# Patient Record
Sex: MALE | Race: WHITE | NOT HISPANIC OR LATINO | Employment: FULL TIME | URBAN - METROPOLITAN AREA
[De-identification: names, ages, dates, MRNs, and addresses within clinical notes are randomized per-mention and may not be internally consistent; named-entity substitution may affect disease eponyms.]

---

## 2023-09-10 ENCOUNTER — APPOINTMENT (OUTPATIENT)
Dept: RADIOLOGY | Facility: IMAGING CENTER | Age: 28
End: 2023-09-10
Attending: NURSE PRACTITIONER
Payer: COMMERCIAL

## 2023-09-10 ENCOUNTER — OFFICE VISIT (OUTPATIENT)
Dept: URGENT CARE | Facility: PHYSICIAN GROUP | Age: 28
End: 2023-09-10
Payer: COMMERCIAL

## 2023-09-10 VITALS
SYSTOLIC BLOOD PRESSURE: 114 MMHG | OXYGEN SATURATION: 97 % | WEIGHT: 163.2 LBS | HEIGHT: 73 IN | TEMPERATURE: 98.1 F | RESPIRATION RATE: 20 BRPM | BODY MASS INDEX: 21.63 KG/M2 | DIASTOLIC BLOOD PRESSURE: 62 MMHG | HEART RATE: 94 BPM

## 2023-09-10 DIAGNOSIS — M77.32 HEEL SPUR, LEFT: ICD-10-CM

## 2023-09-10 DIAGNOSIS — M72.2 PLANTAR FASCIITIS OF LEFT FOOT: ICD-10-CM

## 2023-09-10 DIAGNOSIS — M79.672 LEFT FOOT PAIN: ICD-10-CM

## 2023-09-10 PROCEDURE — 99204 OFFICE O/P NEW MOD 45 MIN: CPT | Performed by: NURSE PRACTITIONER

## 2023-09-10 PROCEDURE — 3078F DIAST BP <80 MM HG: CPT | Performed by: NURSE PRACTITIONER

## 2023-09-10 PROCEDURE — 3074F SYST BP LT 130 MM HG: CPT | Performed by: NURSE PRACTITIONER

## 2023-09-10 PROCEDURE — 73630 X-RAY EXAM OF FOOT: CPT | Mod: TC,LT | Performed by: RADIOLOGY

## 2023-09-10 RX ORDER — PREDNISONE 20 MG/1
60 TABLET ORAL DAILY
Qty: 15 TABLET | Refills: 0 | Status: SHIPPED | OUTPATIENT
Start: 2023-09-10 | End: 2023-09-15

## 2023-09-10 ASSESSMENT — ENCOUNTER SYMPTOMS
NEUROLOGICAL NEGATIVE: 1
CONSTITUTIONAL NEGATIVE: 1

## 2023-09-10 ASSESSMENT — VISUAL ACUITY: OU: 1

## 2023-09-10 NOTE — PATIENT INSTRUCTIONS
Details    Reading Physician Reading Date Result Priority   Hank Zamorano M.D.  [phone] 9/10/2023 Urgent Care     Narrative & Impression     9/10/2023 2:57 PM     HISTORY/REASON FOR EXAM:  Left foot pain     TECHNIQUE/EXAM DESCRIPTION AND NUMBER OF VIEWS:  3 nonweightbearing views of the LEFT foot.     COMPARISON:     FINDINGS: Bone mineralization is normal. There is no evidence of fracture or dislocation. Soft tissues are normal. There is a small plantar calcaneal spur.     IMPRESSION:     No evidence of acute fracture or dislocation.           Exam Ended: 09/10/23  3:08 PM Last Resulted: 09/10/23  3:10 PM

## 2023-09-10 NOTE — PROGRESS NOTES
"Subjective:     Jatin Go is a 28 y.o. male who presents for Foot Problem (Left foot,painful,x2 weeks)       Foot Problem  This is a new problem. The problem has been gradually worsening.     2 weeks ago, patient started concerned spontaneous onset of left pain.  Reports pain starting at the middle of his arch and spreading towards his heel.  Worsens with weightbearing and walking.  Worsens with palpation.  Reports he is on his feet 12 hours a day, 3-4 days a week.  Otherwise, denies injury.  Has tried Aleve with mild improvement in symptoms.  Recently obtained insurance.  Has not seen PCP.    Review of Systems   Constitutional: Negative.    Musculoskeletal:         Per HPI   Neurological: Negative.    All other systems reviewed and are negative.    Refer to HPI for additional details.    During this visit, appropriate PPE was worn, and hand hygiene was performed.    PMH:  has no past medical history on file.    MEDS:   Current Outpatient Medications:     predniSONE (DELTASONE) 20 MG Tab, Take 3 Tablets by mouth every day for 5 days., Disp: 15 Tablet, Rfl: 0    ALLERGIES:   Allergies   Allergen Reactions    Methylphenidate Anxiety     SURGHX: History reviewed. No pertinent surgical history.  SOCHX:      FH: Per HPI as applicable/pertinent.      Objective:     /62 (BP Location: Right arm, Patient Position: Sitting, BP Cuff Size: Adult)   Pulse 94   Temp 36.7 °C (98.1 °F) (Temporal)   Resp 20   Ht 1.854 m (6' 1\")   Wt 74 kg (163 lb 3.2 oz)   SpO2 97%   BMI 21.53 kg/m²     Physical Exam  Nursing note reviewed.   Constitutional:       General: He is not in acute distress.     Appearance: He is well-developed. He is not ill-appearing or toxic-appearing.   Eyes:      General: Vision grossly intact.   Cardiovascular:      Rate and Rhythm: Normal rate.      Pulses: Normal pulses.   Pulmonary:      Effort: Pulmonary effort is normal. No respiratory distress.   Musculoskeletal:         General: No deformity. " Normal range of motion.      Left ankle: Normal.      Left Achilles Tendon: No defects.      Left foot: Normal range of motion and normal capillary refill. Tenderness (Plantar aspect of left foot over mid arch and heel, worse with dorsiflexion of toes) present. No swelling, deformity or laceration.   Skin:     General: Skin is warm and dry.      Capillary Refill: Capillary refill takes less than 2 seconds.      Coloration: Skin is not pale.      Findings: No bruising, erythema or rash.   Neurological:      Mental Status: He is alert and oriented to person, place, and time.      Motor: No weakness.      Gait: Gait abnormal (Antalgic).   Psychiatric:         Behavior: Behavior normal. Behavior is cooperative.     X-ray of left foot:    Details    Reading Physician Reading Date Result Priority   Hank Zamorano M.D.  [phone] 9/10/2023 Urgent Care     Narrative & Impression     9/10/2023 2:57 PM     HISTORY/REASON FOR EXAM:  Left foot pain     TECHNIQUE/EXAM DESCRIPTION AND NUMBER OF VIEWS:  3 nonweightbearing views of the LEFT foot.     COMPARISON:     FINDINGS: Bone mineralization is normal. There is no evidence of fracture or dislocation. Soft tissues are normal. There is a small plantar calcaneal spur.     IMPRESSION:     No evidence of acute fracture or dislocation.           Exam Ended: 09/10/23  3:08 PM Last Resulted: 09/10/23  3:10 PM           Radiology report and images reviewed by myself. Concur with findings.      Assessment/Plan:     1. Left foot pain  - DX-FOOT-COMPLETE 3+ LEFT; Future  - Referral to Podiatry  - Referral to establish with [facility] PCP  - predniSONE (DELTASONE) 20 MG Tab; Take 3 Tablets by mouth every day for 5 days.  Dispense: 15 Tablet; Refill: 0    2. Plantar fasciitis of left foot  - Referral to Podiatry  - Referral to establish with [facility] PCP  - predniSONE (DELTASONE) 20 MG Tab; Take 3 Tablets by mouth every day for 5 days.  Dispense: 15 Tablet; Refill: 0    3. Heel spur, left  -  Referral to Podiatry  - Referral to establish with [facility] PCP  - predniSONE (DELTASONE) 20 MG Tab; Take 3 Tablets by mouth every day for 5 days.  Dispense: 15 Tablet; Refill: 0    Rx as above sent electronically. Rest, ice, elevate. Advised to avoid NSAIDs while on steroid therapy.     Consider new footwear with supportive insoles or OTC orthotics.    Follow up with podiatry; referral placed.    Follow up with PCP for routine/preventive care; referral placed.    Monitor. Warning signs reviewed. Return precautions discussed.     Differential diagnosis, natural history, supportive care, over-the-counter symptom management per 's instructions, close monitoring, and indications for immediate follow-up discussed.     All questions answered. Patient agrees with the plan of care.    Discharge summary provided.    Work note provided.     Billing note: moderate complexity and moderate risk. New patient. [id number]. Please refer to LOS tool for details.

## 2023-09-10 NOTE — LETTER
September 10, 2023         Patient: Jatin Go   YOB: 1995   Date of Visit: 9/10/2023           To Whom it May Concern:    Jatin Go was seen in my clinic on 9/10/2023 due to illness. Due to medical necessity, please excuse patient from work 9/9/2023.    If you have any questions or concerns, please don't hesitate to call.        Sincerely,         ATIF Bonilla.  Electronically Signed

## 2023-09-19 ENCOUNTER — TELEPHONE (OUTPATIENT)
Dept: HEALTH INFORMATION MANAGEMENT | Facility: OTHER | Age: 28
End: 2023-09-19
Payer: COMMERCIAL

## 2023-11-03 ENCOUNTER — OFFICE VISIT (OUTPATIENT)
Dept: URGENT CARE | Facility: CLINIC | Age: 28
End: 2023-11-03
Payer: COMMERCIAL

## 2023-11-03 VITALS
HEIGHT: 71 IN | DIASTOLIC BLOOD PRESSURE: 64 MMHG | WEIGHT: 160 LBS | RESPIRATION RATE: 14 BRPM | TEMPERATURE: 97.1 F | SYSTOLIC BLOOD PRESSURE: 118 MMHG | BODY MASS INDEX: 22.4 KG/M2 | OXYGEN SATURATION: 98 % | HEART RATE: 81 BPM

## 2023-11-03 DIAGNOSIS — R11.2 NAUSEA AND VOMITING, UNSPECIFIED VOMITING TYPE: ICD-10-CM

## 2023-11-03 DIAGNOSIS — R19.7 DIARRHEA OF PRESUMED INFECTIOUS ORIGIN: ICD-10-CM

## 2023-11-03 PROCEDURE — 99214 OFFICE O/P EST MOD 30 MIN: CPT | Performed by: NURSE PRACTITIONER

## 2023-11-03 PROCEDURE — 3074F SYST BP LT 130 MM HG: CPT | Performed by: NURSE PRACTITIONER

## 2023-11-03 PROCEDURE — 3078F DIAST BP <80 MM HG: CPT | Performed by: NURSE PRACTITIONER

## 2023-11-03 RX ORDER — ONDANSETRON 2 MG/ML
4 INJECTION INTRAMUSCULAR; INTRAVENOUS ONCE
Status: COMPLETED | OUTPATIENT
Start: 2023-11-03 | End: 2023-11-03

## 2023-11-03 RX ORDER — ONDANSETRON 4 MG/1
4 TABLET, ORALLY DISINTEGRATING ORAL EVERY 8 HOURS PRN
Qty: 10 TABLET | Refills: 0 | Status: SHIPPED | OUTPATIENT
Start: 2023-11-03 | End: 2024-03-07

## 2023-11-03 RX ADMIN — ONDANSETRON 4 MG: 2 INJECTION INTRAMUSCULAR; INTRAVENOUS at 19:39

## 2023-11-03 ASSESSMENT — ENCOUNTER SYMPTOMS
BLOOD IN STOOL: 0
VOMITING: 1
CHILLS: 0
ROS GI COMMENTS: 1
DIARRHEA: 1
FATIGUE: 1
FEVER: 0
ABDOMINAL PAIN: 1
NAUSEA: 1
CHANGE IN BOWEL HABIT: 1
CONSTIPATION: 0

## 2023-11-04 NOTE — PROGRESS NOTES
"Subjective:   Jatin Go is a 28 y.o. male who presents for Food Poisoning (X 2day, vomit & diarrhea)      GI Problem  This is a new problem. Episode onset: 2 days; food poisoning of sushi. The problem occurs constantly. The problem has been gradually worsening. Associated symptoms include abdominal pain, a change in bowel habit, fatigue, nausea and vomiting. Pertinent negatives include no chills, congestion or fever. The symptoms are aggravated by eating. He has tried drinking for the symptoms. The treatment provided no relief.       Review of Systems   Constitutional:  Positive for fatigue and malaise/fatigue. Negative for chills and fever.   HENT:  Negative for congestion.    Gastrointestinal:  Positive for abdominal pain, change in bowel habit, diarrhea, nausea and vomiting. Negative for blood in stool, constipation and melena.        1       Medications:    ondansetron  ondansetron Tbdp    Allergies: Methylphenidate    Problem List: Jatin Go does not have a problem list on file.    Surgical History:  No past surgical history on file.    Past Social Hx: Jatin Go       Past Family Hx:  Jatin Go family history is not on file.     Problem list, medications, and allergies reviewed by myself today in Epic.     Objective:     /64 (BP Location: Left arm, Patient Position: Sitting, BP Cuff Size: Adult)   Pulse 81   Temp 36.2 °C (97.1 °F)   Resp 14   Ht 1.803 m (5' 11\")   Wt 72.6 kg (160 lb)   SpO2 98%   BMI 22.32 kg/m²     Physical Exam  Vitals and nursing note reviewed.   Constitutional:       General: He is not in acute distress.     Appearance: He is well-developed.   HENT:      Head: Normocephalic and atraumatic.      Right Ear: External ear normal.      Left Ear: External ear normal.      Nose: Nose normal.      Mouth/Throat:      Mouth: Mucous membranes are moist.   Eyes:      Conjunctiva/sclera: Conjunctivae normal.   Cardiovascular:      Rate and Rhythm: Normal rate.   Pulmonary:      Effort: " Pulmonary effort is normal. No respiratory distress.      Breath sounds: Normal breath sounds.   Abdominal:      General: Bowel sounds are normal. There is no distension.      Tenderness: There is no abdominal tenderness. There is no right CVA tenderness, left CVA tenderness, guarding or rebound. Negative signs include Fitzgerald's sign, Rovsing's sign, McBurney's sign, psoas sign and obturator sign.   Musculoskeletal:         General: Normal range of motion.   Skin:     General: Skin is warm and dry.   Neurological:      General: No focal deficit present.      Mental Status: He is alert and oriented to person, place, and time. Mental status is at baseline.      Gait: Gait (gait at baseline) normal.   Psychiatric:         Judgment: Judgment normal.         Assessment/Plan:     Diagnosis and associated orders:     1. Nausea and vomiting, unspecified vomiting type  ondansetron (ZOFRAN ODT) 4 MG TABLET DISPERSIBLE    ondansetron (Zofran) syringe/vial injection 4 mg      2. Diarrhea of presumed infectious origin           Comments/MDM:     Patient with acute illness with systemic nausea, vomiting and diarrhea abdomen with no acute tenderness treated with oral antiemetic in clinic it was explained today that due to the viral nature of the pt's illness, we will treat symptomatically today.   Encouraged OTC supportive meds PRN. Humidification, increase fluids, avoid night time dairy.   Discussed side effects of OTC meds and any prescribed.  Given precautionary s/sx that mandate immediate follow up and evaluation in the ED. Advised of risks of not doing so.    DDX, Supportive care, and indications for immediate follow-up discussed with patient.    Instructed to return to clinic or nearest emergency department if we are not available for any change in condition, further concerns, or worsening of symptoms.    The patient  and/or guardian demonstrated a good understanding and agreed with the treatment plan.                 Please  note that this dictation was created using voice recognition software. I have made a reasonable attempt to correct obvious errors, but I expect that there are errors of grammar and possibly content that I did not discover before finalizing the note.    This note was electronically signed by Mekhi SARABIA.

## 2024-03-07 ENCOUNTER — OFFICE VISIT (OUTPATIENT)
Dept: URGENT CARE | Facility: CLINIC | Age: 29
End: 2024-03-07
Payer: COMMERCIAL

## 2024-03-07 VITALS
TEMPERATURE: 97.3 F | WEIGHT: 150 LBS | HEART RATE: 76 BPM | RESPIRATION RATE: 18 BRPM | BODY MASS INDEX: 20.32 KG/M2 | OXYGEN SATURATION: 97 % | SYSTOLIC BLOOD PRESSURE: 122 MMHG | DIASTOLIC BLOOD PRESSURE: 70 MMHG | HEIGHT: 72 IN

## 2024-03-07 DIAGNOSIS — M79.672 LEFT FOOT PAIN: ICD-10-CM

## 2024-03-07 DIAGNOSIS — M77.32 HEEL SPUR, LEFT: ICD-10-CM

## 2024-03-07 DIAGNOSIS — M72.2 PLANTAR FASCIITIS OF LEFT FOOT: ICD-10-CM

## 2024-03-07 PROCEDURE — 99214 OFFICE O/P EST MOD 30 MIN: CPT | Performed by: NURSE PRACTITIONER

## 2024-03-07 PROCEDURE — 3078F DIAST BP <80 MM HG: CPT | Performed by: NURSE PRACTITIONER

## 2024-03-07 PROCEDURE — 3074F SYST BP LT 130 MM HG: CPT | Performed by: NURSE PRACTITIONER

## 2024-03-07 RX ORDER — PREDNISONE 20 MG/1
60 TABLET ORAL DAILY
Qty: 15 TABLET | Refills: 0 | Status: SHIPPED | OUTPATIENT
Start: 2024-03-07 | End: 2024-03-12

## 2024-03-07 ASSESSMENT — ENCOUNTER SYMPTOMS
NEUROLOGICAL NEGATIVE: 1
CONSTITUTIONAL NEGATIVE: 1

## 2024-03-07 ASSESSMENT — VISUAL ACUITY: OU: 1

## 2024-03-07 NOTE — LETTER
March 7, 2024         Patient: Jatin Go   YOB: 1995   Date of Visit: 3/7/2024           To Whom it May Concern:    Jatin Go was seen in my clinic on 3/7/2024 due to illness. Due to medical necessity, please excuse patient from work 3/7/2024.    If you have any questions or concerns, please don't hesitate to call.        Sincerely,         ATIF Bonilla.  Electronically Signed

## 2024-03-08 NOTE — PROGRESS NOTES
"Subjective:     Jatin Go is a 28 y.o. male who presents for Foot Pain (Lt foot,worse than before\" growth spur\" bottom of Lt bottom of heel, x 2 months )       Foot Problem  This is a chronic problem. The problem has been gradually worsening.     Seen in  9/10/2023.  Was experiencing left heel pain and middle arch pain.  X-ray was performed showing plantar calcaneal spur.  Diagnosed with left heel spur and plantar fasciitis of the left foot.  Was referred to podiatry as well as PCP.  Was prescribed prednisone.  Patient reports the prednisone helped significantly.  However, 1 month ago, started to develop reoccurrence of similar pain.  Was severe today and was not able to go to work.  Worsens with ambulation and palpation.  Needs a note.  Has not had a chance to schedule with podiatry yet due to being busy taking care of his dental problems.  Currently taking ibuprofen with no improvement in symptoms.    Review of Systems   Constitutional: Negative.    Musculoskeletal:         Per HPI   Neurological: Negative.    All other systems reviewed and are negative.    Refer to HPI for additional details.    During this visit, appropriate PPE was worn, and hand hygiene was performed.    PMH:  has no past medical history on file.    MEDS:   Current Outpatient Medications:     NON SPECIFIED, Buprophen 25 MG  PO, Disp: , Rfl:     predniSONE (DELTASONE) 20 MG Tab, Take 3 Tablets by mouth every day for 5 days., Disp: 15 Tablet, Rfl: 0    ALLERGIES:   Allergies   Allergen Reactions    Methylphenidate Anxiety     SURGHX: History reviewed. No pertinent surgical history.  SOCHX:  reports that he has never smoked. He has never used smokeless tobacco. He reports that he does not currently use alcohol. He reports current drug use. Drug: Oral.    FH: Per HPI as applicable/pertinent.      Objective:     /70 (BP Location: Left arm, Patient Position: Sitting, BP Cuff Size: Adult)   Pulse 76   Temp 36.3 °C (97.3 °F) (Temporal)   " Resp 18   Ht 1.829 m (6')   Wt 68 kg (150 lb)   SpO2 97%   BMI 20.34 kg/m²     Physical Exam  Nursing note reviewed.   Constitutional:       General: He is not in acute distress.     Appearance: He is well-developed. He is not ill-appearing or toxic-appearing.   Eyes:      General: Vision grossly intact.   Cardiovascular:      Rate and Rhythm: Normal rate.      Pulses: Normal pulses.   Pulmonary:      Effort: Pulmonary effort is normal. No respiratory distress.   Musculoskeletal:         General: No deformity. Normal range of motion.      Left foot: Normal range of motion and normal capillary refill. Tenderness (Plantar aspect of left foot over mid arch and heel, worse with dorsiflexion of toes) present. No swelling, deformity or laceration. Normal pulse.   Skin:     General: Skin is warm and dry.      Capillary Refill: Capillary refill takes less than 2 seconds.      Coloration: Skin is not pale.      Findings: No bruising or erythema.   Neurological:      Mental Status: He is alert and oriented to person, place, and time.      Motor: No weakness.      Gait: Gait abnormal (Antalgic).   Psychiatric:         Behavior: Behavior normal. Behavior is cooperative.       Assessment/Plan:     1. Left foot pain  - predniSONE (DELTASONE) 20 MG Tab; Take 3 Tablets by mouth every day for 5 days.  Dispense: 15 Tablet; Refill: 0    2. Plantar fasciitis of left foot  - predniSONE (DELTASONE) 20 MG Tab; Take 3 Tablets by mouth every day for 5 days.  Dispense: 15 Tablet; Refill: 0    3. Heel spur, left  - predniSONE (DELTASONE) 20 MG Tab; Take 3 Tablets by mouth every day for 5 days.  Dispense: 15 Tablet; Refill: 0    Rx as above sent electronically.  Advised to avoid NSAIDs while on steroid therapy.  Rest, ice, elevate.  May use OTC Tylenol as needed.  Work note provided.  Contact information for podiatry provided.  Patient will call to schedule.    Differential diagnosis, natural history, supportive care, over-the-counter  symptom management per 's instructions, close monitoring, and indications for immediate follow-up discussed.     All questions answered. Patient agrees with the plan of care.    Discharge summary provided.    Billing note: chronic illness with exacerbation/progression; prescription drug management. Established patient. [id number]. Please refer to LOS tool for details.

## 2024-03-08 NOTE — PATIENT INSTRUCTIONS
[facility]  [id number]  [place] [id number]  Phone: [phone]

## 2024-03-28 ENCOUNTER — HOSPITAL ENCOUNTER (EMERGENCY)
Facility: MEDICAL CENTER | Age: 29
End: 2024-03-28
Attending: EMERGENCY MEDICINE
Payer: COMMERCIAL

## 2024-03-28 ENCOUNTER — APPOINTMENT (OUTPATIENT)
Dept: RADIOLOGY | Facility: MEDICAL CENTER | Age: 29
End: 2024-03-28
Attending: EMERGENCY MEDICINE
Payer: COMMERCIAL

## 2024-03-28 VITALS
HEART RATE: 73 BPM | TEMPERATURE: 98.4 F | DIASTOLIC BLOOD PRESSURE: 78 MMHG | SYSTOLIC BLOOD PRESSURE: 110 MMHG | RESPIRATION RATE: 18 BRPM | WEIGHT: 144.4 LBS | OXYGEN SATURATION: 97 % | BODY MASS INDEX: 19.14 KG/M2 | HEIGHT: 73 IN

## 2024-03-28 DIAGNOSIS — J10.1 INFLUENZA B: ICD-10-CM

## 2024-03-28 LAB
FLUAV RNA SPEC QL NAA+PROBE: NEGATIVE
FLUBV RNA SPEC QL NAA+PROBE: POSITIVE
RSV RNA SPEC QL NAA+PROBE: NEGATIVE
SARS-COV-2 RNA RESP QL NAA+PROBE: NOTDETECTED

## 2024-03-28 PROCEDURE — 700102 HCHG RX REV CODE 250 W/ 637 OVERRIDE(OP): Performed by: EMERGENCY MEDICINE

## 2024-03-28 PROCEDURE — 0241U HCHG SARS-COV-2 COVID-19 NFCT DS RESP RNA 4 TRGT ED POC: CPT

## 2024-03-28 PROCEDURE — 71045 X-RAY EXAM CHEST 1 VIEW: CPT

## 2024-03-28 PROCEDURE — 700111 HCHG RX REV CODE 636 W/ 250 OVERRIDE (IP): Performed by: EMERGENCY MEDICINE

## 2024-03-28 PROCEDURE — A9270 NON-COVERED ITEM OR SERVICE: HCPCS | Performed by: EMERGENCY MEDICINE

## 2024-03-28 PROCEDURE — 99284 EMERGENCY DEPT VISIT MOD MDM: CPT

## 2024-03-28 RX ORDER — ACETAMINOPHEN 500 MG
1000 TABLET ORAL ONCE
Status: COMPLETED | OUTPATIENT
Start: 2024-03-28 | End: 2024-03-28

## 2024-03-28 RX ORDER — IBUPROFEN 600 MG/1
600 TABLET ORAL ONCE
Status: COMPLETED | OUTPATIENT
Start: 2024-03-28 | End: 2024-03-28

## 2024-03-28 RX ORDER — ONDANSETRON 4 MG/1
4 TABLET, ORALLY DISINTEGRATING ORAL ONCE
Status: COMPLETED | OUTPATIENT
Start: 2024-03-28 | End: 2024-03-28

## 2024-03-28 RX ORDER — OSELTAMIVIR PHOSPHATE 75 MG/1
75 CAPSULE ORAL 2 TIMES DAILY
Qty: 10 CAPSULE | Refills: 0 | Status: ACTIVE | OUTPATIENT
Start: 2024-03-28 | End: 2024-04-02

## 2024-03-28 RX ADMIN — IBUPROFEN 600 MG: 600 TABLET, FILM COATED ORAL at 16:05

## 2024-03-28 RX ADMIN — ONDANSETRON 4 MG: 4 TABLET, ORALLY DISINTEGRATING ORAL at 16:05

## 2024-03-28 RX ADMIN — ACETAMINOPHEN 1000 MG: 500 TABLET, FILM COATED ORAL at 16:05

## 2024-03-28 NOTE — ED TRIAGE NOTES
"Chief Complaint   Patient presents with    Flu Like Symptoms     Pt states he started feeling sick last night, headache, body aches, cough productive yellow in color congestion and photophobia      Patient also reports chills, pt ax0x4, ambulatory to triage. Pt educated on triage process and wait time. Educated on letting  know if condition changes. PCR covid running.     /81   Pulse 100   Temp 37.4 °C (99.4 °F) (Temporal)   Resp 20   Ht 1.854 m (6' 1\")   Wt 65.5 kg (144 lb 6.4 oz)   SpO2 98%   BMI 19.05 kg/m²     "

## 2024-03-28 NOTE — ED PROVIDER NOTES
"  ER Provider Note    Scribed for Dylan Gentile M.d. by Jensen Dasilva. 3/28/2024  3:52 PM    Primary Care Provider: Pcp Pt States None    CHIEF COMPLAINT  Chief Complaint   Patient presents with    Flu Like Symptoms     Pt states he started feeling sick last night, headache, body aches, cough productive yellow in color congestion and photophobia      LIMITATION TO HISTORY   Select: : None    HPI/ROS  OUTSIDE HISTORIAN(S):  None     EXTERNAL RECORDS REVIEWED  Other No recent visits for similar symptoms    Jatin Go is a 28 y.o. male who presents to the ED for evaluation of flu like symptoms. Onset last night. The patient reports associated symptoms of congestion, runny nose, cough, shortness of breath, headache, body aches, and fatigue. The patient denies any known fever or chills. He has been taking Dayquil with minimal alleviation. He last took some last night at approximately 9 PM. The patient has no major past medical history, takes no daily medications other than a medication to aid in quitting smoking. He has no allergies to medication.     PAST MEDICAL HISTORY  No past medical history reported.     SURGICAL HISTORY  No past surgical history reported.     FAMILY HISTORY  No family history reported.    SOCIAL HISTORY   reports that he has never smoked. He has never used smokeless tobacco. He reports that he does not currently use alcohol. He reports current drug use. Drug: Oral.    CURRENT MEDICATIONS  Previous Medications    NON SPECIFIED    Buprophen 25 MG  PO       ALLERGIES  Methylphenidate    PHYSICAL EXAM  /81   Pulse 100   Temp 37.4 °C (99.4 °F) (Temporal)   Resp 20   Ht 1.854 m (6' 1\")   Wt 65.5 kg (144 lb 6.4 oz)   SpO2 98%   BMI 19.05 kg/m²     Constitutional: Awake alert appears uncomfortable no acute cardiopulmonary distress  HENT: Normocephalic, Atraumatic, Bilateral external ears normal, Oropharynx erythematous with cobblestoning.  Nose is swollen mucosa with clear " rhinorrhea.  Eyes: PERRL, EOMI, Conjunctiva normal, No discharge.   Neck: Normal range of motion, anterior supracervical lymphadenopathy no meningismus.  Cardiovascular: Normal heart rate, Normal rhythm, No murmurs, No rubs, No gallops.   Thorax & Lungs: Normal breath sounds, No respiratory distress, No wheezing  Abdomen:  Soft, No tenderness  Skin: Warm, Dry, No erythema, No rash.   Back: No tenderness, No CVA tenderness.   Musculoskeletal: Good range of motion in all major joints.  Neurologic: Alert, No focal deficits noted.       DIAGNOSTIC STUDIES & PROCEDURES    Labs:   Results for orders placed or performed during the hospital encounter of 03/28/24   POC CoV-2, FLU A/B, RSV by PCR   Result Value Ref Range    POC Influenza A RNA, PCR Negative Negative    POC Influenza B RNA, PCR POSITIVE (A) Negative    POC RSV, by PCR Negative Negative    POC SARS-CoV-2, PCR NotDetected      All labs reviewed by me.    Radiology:   The attending Emergency Physician has independently interpreted the diagnostic imaging associated with this visit and is awaiting the final reading from the radiologist, which will be displayed below.      Radiologist interpretation:     DX-CHEST-PORTABLE (1 VIEW)   Final Result      No evidence of acute cardiopulmonary process.           COURSE & MEDICAL DECISION MAKING    ED Observation Status? No; Patient does not meet criteria for ED Observation.     INITIAL ASSESSMENT AND PLAN  Care Narrative: Records reviewed to establish patient's baseline labs and vitals.        3:52 PM - Patient seen and evaluated at bedside. Jatin Go is a 28 y.o. male who presents with flu like symptoms onset yesterday. Patient will be treated with Tylenol tablet 1 g, Motrin tablet 600 mg, and Zofran dispertab 4 mg for his symptoms. Ordered DX-Chest and POCT CoV-2, Flu A/B, and RSV  to evaluate. He understands and agrees to the plan of care. Differential diagnoses include but are not limited to: Viral illness most likely  influenza also considered RSV COVID and other viruses.  Considered secondary pneumonia.    Patient feels achy and ill he has headache and bodyaches and overall is a presentation strongly ches with influenza during influenza surge in the community.  He is currently febrile.  Flu swab is positive.  His lungs are clear is not hypoxic or tachypneic I doubt pneumonia.  X-ray does not show any evidence of pneumonia either.    Patient is treated with antipyretics and analgesia here in the ED with Tylenol and ibuprofen given a Zofran.  When she can tolerate fluids.  Once he is feeling better we can let him go home with treatment for influenza.  We will offer him Tamiflu.  I think is unlikely to be particularly helpful may shorten his course will discuss this with him.  To be given return precautions and discharge instructions for influenza.  Once he is feeling better he will be discharged home.  He is agreeable with this plan.    Patient be advised follow-up with his doctor or [facility].               DISPOSITION AND DISCUSSIONS      FINAL IMPRESSION   1. Influenza B         Jensen MENESES (Pericoibe), am scribing for, and in the presence of, Dylan Gentile M.D..    Electronically signed by: Jensen Dasilva (Scribe), 3/28/2024    IDylan M.D. personally performed the services described in this documentation, as scribed by Jensen Dasilva in my presence, and it is both accurate and complete.    The note accurately reflects work and decisions made by me.  Dylan Gentile M.D.  3/28/2024  4:27 PM

## 2024-03-28 NOTE — DISCHARGE INSTRUCTIONS
You have influenza virus.  Rest, drink plenty of fluids.  Take over-the-counter ibuprofen and or Tylenol as directed for aches and pains and fever.  Return for syn cough, shortness of breath fever or other concerns.  Follow-up with your doctor.

## 2024-03-28 NOTE — ED NOTES
Patient medicated per MAR.    [Joint Pain] : joint pain [Joint Swelling] : joint swelling [Negative] : Heme/Lymph

## 2024-03-29 ENCOUNTER — HOSPITAL ENCOUNTER (EMERGENCY)
Facility: MEDICAL CENTER | Age: 29
End: 2024-03-29
Attending: STUDENT IN AN ORGANIZED HEALTH CARE EDUCATION/TRAINING PROGRAM
Payer: COMMERCIAL

## 2024-03-29 ENCOUNTER — APPOINTMENT (OUTPATIENT)
Dept: RADIOLOGY | Facility: MEDICAL CENTER | Age: 29
End: 2024-03-29
Attending: STUDENT IN AN ORGANIZED HEALTH CARE EDUCATION/TRAINING PROGRAM
Payer: COMMERCIAL

## 2024-03-29 VITALS
DIASTOLIC BLOOD PRESSURE: 74 MMHG | RESPIRATION RATE: 18 BRPM | OXYGEN SATURATION: 98 % | TEMPERATURE: 98.2 F | HEIGHT: 73 IN | HEART RATE: 94 BPM | WEIGHT: 144.84 LBS | SYSTOLIC BLOOD PRESSURE: 118 MMHG | BODY MASS INDEX: 19.2 KG/M2

## 2024-03-29 DIAGNOSIS — R19.7 NAUSEA VOMITING AND DIARRHEA: ICD-10-CM

## 2024-03-29 DIAGNOSIS — R06.02 SHORTNESS OF BREATH: ICD-10-CM

## 2024-03-29 DIAGNOSIS — R50.9 FEBRILE ILLNESS: ICD-10-CM

## 2024-03-29 DIAGNOSIS — J11.1 INFLUENZA: ICD-10-CM

## 2024-03-29 DIAGNOSIS — R11.2 NAUSEA VOMITING AND DIARRHEA: ICD-10-CM

## 2024-03-29 LAB — EKG IMPRESSION: NORMAL

## 2024-03-29 PROCEDURE — A9270 NON-COVERED ITEM OR SERVICE: HCPCS | Performed by: STUDENT IN AN ORGANIZED HEALTH CARE EDUCATION/TRAINING PROGRAM

## 2024-03-29 PROCEDURE — 99284 EMERGENCY DEPT VISIT MOD MDM: CPT

## 2024-03-29 PROCEDURE — 700102 HCHG RX REV CODE 250 W/ 637 OVERRIDE(OP): Performed by: STUDENT IN AN ORGANIZED HEALTH CARE EDUCATION/TRAINING PROGRAM

## 2024-03-29 PROCEDURE — 93005 ELECTROCARDIOGRAM TRACING: CPT | Performed by: STUDENT IN AN ORGANIZED HEALTH CARE EDUCATION/TRAINING PROGRAM

## 2024-03-29 PROCEDURE — 700111 HCHG RX REV CODE 636 W/ 250 OVERRIDE (IP): Performed by: STUDENT IN AN ORGANIZED HEALTH CARE EDUCATION/TRAINING PROGRAM

## 2024-03-29 PROCEDURE — 93005 ELECTROCARDIOGRAM TRACING: CPT

## 2024-03-29 PROCEDURE — 71045 X-RAY EXAM CHEST 1 VIEW: CPT

## 2024-03-29 RX ORDER — IBUPROFEN 600 MG/1
600 TABLET ORAL ONCE
Status: COMPLETED | OUTPATIENT
Start: 2024-03-29 | End: 2024-03-29

## 2024-03-29 RX ORDER — LOPERAMIDE HYDROCHLORIDE 2 MG/1
2 CAPSULE ORAL 4 TIMES DAILY PRN
Qty: 30 CAPSULE | Refills: 0 | Status: SHIPPED | OUTPATIENT
Start: 2024-03-29

## 2024-03-29 RX ORDER — LOPERAMIDE HYDROCHLORIDE 2 MG/1
2 CAPSULE ORAL ONCE
Status: COMPLETED | OUTPATIENT
Start: 2024-03-29 | End: 2024-03-29

## 2024-03-29 RX ORDER — ONDANSETRON 4 MG/1
4 TABLET, FILM COATED ORAL EVERY 4 HOURS PRN
Qty: 10 TABLET | Refills: 0 | Status: SHIPPED | OUTPATIENT
Start: 2024-03-29 | End: 2024-04-03

## 2024-03-29 RX ORDER — ONDANSETRON 4 MG/1
4 TABLET, ORALLY DISINTEGRATING ORAL ONCE
Status: COMPLETED | OUTPATIENT
Start: 2024-03-29 | End: 2024-03-29

## 2024-03-29 RX ADMIN — ONDANSETRON 4 MG: 4 TABLET, ORALLY DISINTEGRATING ORAL at 22:02

## 2024-03-29 RX ADMIN — IBUPROFEN 600 MG: 600 TABLET, FILM COATED ORAL at 22:02

## 2024-03-29 RX ADMIN — LOPERAMIDE HYDROCHLORIDE 2 MG: 2 CAPSULE ORAL at 22:02

## 2024-03-29 ASSESSMENT — PAIN DESCRIPTION - PAIN TYPE: TYPE: ACUTE PAIN

## 2024-03-29 NOTE — ED NOTES
Patient provided discharge instructions and medication information. Patient verbalizes understanding and denies any further questions. Patient instructed to return if condition worsens. No distress noted. Patient ambulated to the front lobby with a steady gait and all belongings.

## 2024-03-29 NOTE — Clinical Note
Jatin Go was seen and treated in our emergency department on 3/29/2024.  He may return to work on 04/02/2024.  Should be fever free for 24 hours before returning to work     If you have any questions or concerns, please don't hesitate to call.      Gallo Pugh

## 2024-03-30 NOTE — ED PROVIDER NOTES
CHIEF COMPLAINT  Chief Complaint   Patient presents with    Flu Like Symptoms     Pt was seen in ER a day ago and diagnosed with influenza B. Pt reports he has developed N/V/D, SOB and dizziness since.          LIMITATION TO HISTORY   Select:     MILES Go is a 28 y.o. male who presents to the Emergency Department for evaluation of flulike symptoms patient was diagnosed with influenza B yesterday started taking Tamiflu today today he reports feeling some shortness of breath with nausea vomiting diarrhea and reports several episodes of diarrhea and 1 episode of nausea with vomiting reports shortness of breath with exertion denies shortness of breath at rest    OUTSIDE HISTORIAN(S):  Select:    EXTERNAL RECORDS REVIEWED  Select:       PAST MEDICAL HISTORY  History reviewed. No pertinent past medical history.  .    SURGICAL HISTORY  History reviewed. No pertinent surgical history.      FAMILY HISTORY  History reviewed. No pertinent family history.       SOCIAL HISTORY  Social History     Socioeconomic History    Marital status: Single     Spouse name: Not on file    Number of children: Not on file    Years of education: Not on file    Highest education level: Not on file   Occupational History    Not on file   Tobacco Use    Smoking status: Never    Smokeless tobacco: Never   Vaping Use    Vaping Use: Some days    Substances: Nicotine, THC   Substance and Sexual Activity    Alcohol use: Not Currently     Comment: once a month    Drug use: Yes     Types: Oral     Comment: THC    Sexual activity: Not on file   Other Topics Concern    Not on file   Social History Narrative    Not on file     Social Determinants of Health     Financial Resource Strain: Not on file   Food Insecurity: Not on file   Transportation Needs: Not on file   Physical Activity: Not on file   Stress: Not on file   Social Connections: Not on file   Intimate Partner Violence: Not on file   Housing Stability: Not on file         CURRENT  "MEDICATIONS  No current facility-administered medications on file prior to encounter.     Current Outpatient Medications on File Prior to Encounter   Medication Sig Dispense Refill    oseltamivir (TAMIFLU) 75 MG Cap Take 1 Capsule by mouth 2 times a day for 5 days. 10 Capsule 0    NON SPECIFIED Buprophen 25 MG  PO             ALLERGIES  Allergies   Allergen Reactions    Methylphenidate Anxiety       PHYSICAL EXAM  VITAL SIGNS:/74   Pulse 94   Temp 36.8 °C (98.2 °F) (Temporal)   Resp 18   Ht 1.854 m (6' 1\")   Wt 65.7 kg (144 lb 13.5 oz)   SpO2 98%   BMI 19.11 kg/m²       GENERAL: Awake and alert  HEAD: Normocephalic and atraumatic  NECK: Normal range of motion, without meningismus  EYES: Pupils Equal, Round, Reactive to Light, extraocular movements intact, conjunctiva white  ENT: Mucous membranes moist, oropharynx clear  PULMONARY: Normal effort, clear to auscultation  CARDIOVASCULAR: No murmurs, clicks or rubs, peripheral pulses 2+  ABDOMINAL: Soft, non-tender, no guarding or rigidity present, no pulsatile masses  BACK: no midline tenderness, no costovertebral tenderness  NEUROLOGICAL: Grossly non-focal neurological examination, speech normal, gait normal  EXTREMITIES: No edema, normal to inspection  SKIN: Warm and dry.  PSYCHIATRIC: Affect is appropriate    RADIOLOGY  I independently reviewed and interpreted the images obtained today in the ER.  No pneumonia    Radiologist interpretation:   DX-CHEST-LIMITED (1 VIEW)   Final Result         1.  No acute cardiopulmonary disease.           COURSE & MEDICAL DECISION MAKING    ED COURSE:        INTERVENTIONS BY ME:  Medications   ondansetron (Zofran ODT) dispertab 4 mg (4 mg Oral Given 3/29/24 2202)   loperamide (Imodium) capsule 2 mg (2 mg Oral Given 3/29/24 2202)   ibuprofen (Motrin) tablet 600 mg (600 mg Oral Given 3/29/24 2202)       Response on recheck:  Reevaluation patient with normal vital signs not tachypneic reassuring examination discussed workup " and plan of care he is tolerating oral fluids at this time    INITIAL ASSESSMENT, COURSE AND PLAN  Care Narrative:     Patient presenting with nausea vomiting diarrhea and shortness of breath in the setting of being diagnosed with influenza his symptoms do entirely appear to be attributable to influenza I do not see signs of appendicitis or bowel obstruction he is well-appearing without clinical signs of dehydration and tolerating oral fluids considered obtaining CBC and CMP and urinalysis to evaluate for other pathology however his examination is reassuring.  Patient PERC negative feel pulmonary embolism was excluded chest radiograph without evidence of infiltrate.  Counseled patient on symptomatic management return precautions provided for worsening symptom.  Patient without signs of dysentery or bacterial diarrhea feel patient is safe to take loperamide         ADDITIONAL PROBLEM LIST    DISPOSITION AND DISCUSSIONS  Discussion of management with other QHP or appropriate source(s): None       Escalation of care considered, and ultimately not performed:Laboratory analysis and diagnostic imaging    Decision tools and prescription drugs considered including, but not limited to: Prescribed Zofran and loperamide    FINAL DIAGNOSIS  1. Influenza    2. Nausea vomiting and diarrhea    3. Febrile illness    4. Shortness of breath             Electronically signed by: Gallo Pugh DO ,11:06 PM 03/29/24

## 2024-03-30 NOTE — ED TRIAGE NOTES
"Chief Complaint   Patient presents with    Flu Like Symptoms     Pt was seen in ER a day ago and diagnosed with influenza B. Pt reports he has developed N/V/D, SOB and dizziness since.            Pt ambulated to triage for above complaint.  Pt is AO x 4, follows commands, and responds appropriately to questions. Patient's breathing is mildly labored and pain is currently 7/10 on the 0-10 pain scale.  Pt placed in lobby. Patient educated on triage process and encouraged to alert staff for any changes.      /71   Pulse 98   Temp 36.8 °C (98.3 °F) (Temporal)   Resp 16   Ht 1.854 m (6' 1\")   Wt 65.7 kg (144 lb 13.5 oz)   SpO2 100%     "